# Patient Record
Sex: MALE | URBAN - METROPOLITAN AREA
[De-identification: names, ages, dates, MRNs, and addresses within clinical notes are randomized per-mention and may not be internally consistent; named-entity substitution may affect disease eponyms.]

---

## 2021-01-15 ENCOUNTER — NEW PATIENT (OUTPATIENT)
Dept: URBAN - METROPOLITAN AREA CLINIC 43 | Facility: CLINIC | Age: 71
End: 2021-01-15
Payer: COMMERCIAL

## 2021-01-15 DIAGNOSIS — H52.4 PRESBYOPIA: ICD-10-CM

## 2021-01-15 DIAGNOSIS — H25.812 COMBINED FORMS OF AGE-RELATED CATARACT, LEFT EYE: ICD-10-CM

## 2021-01-15 DIAGNOSIS — H40.023 OPEN ANGLE WITH BORDERLINE FINDINGS, HIGH RISK, BILATERAL: ICD-10-CM

## 2021-01-15 DIAGNOSIS — Z79.4 LONG TERM (CURRENT) USE OF INSULIN: ICD-10-CM

## 2021-01-15 PROCEDURE — 99204 OFFICE O/P NEW MOD 45 MIN: CPT | Performed by: OPTOMETRIST

## 2021-01-15 PROCEDURE — 92134 CPTRZ OPH DX IMG PST SGM RTA: CPT | Performed by: OPTOMETRIST

## 2021-01-15 ASSESSMENT — KERATOMETRY
OD: 41.88
OS: 42.38

## 2021-01-15 ASSESSMENT — VISUAL ACUITY
OS: 20/20
OD: 20/20

## 2021-01-15 ASSESSMENT — INTRAOCULAR PRESSURE
OD: 19
OS: 20

## 2021-02-03 ENCOUNTER — FOLLOW UP ESTABLISHED (OUTPATIENT)
Dept: URBAN - METROPOLITAN AREA CLINIC 43 | Facility: CLINIC | Age: 71
End: 2021-02-03
Payer: COMMERCIAL

## 2021-02-03 PROCEDURE — 92133 CPTRZD OPH DX IMG PST SGM ON: CPT | Performed by: OPTOMETRIST

## 2021-02-03 PROCEDURE — 92083 EXTENDED VISUAL FIELD XM: CPT | Performed by: OPTOMETRIST

## 2021-02-03 PROCEDURE — 76514 ECHO EXAM OF EYE THICKNESS: CPT | Performed by: OPTOMETRIST

## 2021-02-03 PROCEDURE — 92012 INTRM OPH EXAM EST PATIENT: CPT | Performed by: OPTOMETRIST

## 2021-02-03 RX ORDER — LATANOPROST 50 UG/ML
0.005 % SOLUTION OPHTHALMIC
Qty: 2.5 | Refills: 3 | Status: INACTIVE
Start: 2021-02-03 | End: 2021-03-03

## 2021-02-03 ASSESSMENT — INTRAOCULAR PRESSURE
OS: 15
OD: 17

## 2021-03-03 ENCOUNTER — FOLLOW UP ESTABLISHED (OUTPATIENT)
Dept: URBAN - METROPOLITAN AREA CLINIC 43 | Facility: CLINIC | Age: 71
End: 2021-03-03
Payer: COMMERCIAL

## 2021-03-03 PROCEDURE — 92012 INTRM OPH EXAM EST PATIENT: CPT | Performed by: OPTOMETRIST

## 2021-03-03 RX ORDER — LATANOPROST 50 UG/ML
0.005 % SOLUTION OPHTHALMIC
Qty: 7.5 | Refills: 1 | Status: INACTIVE
Start: 2021-03-03 | End: 2021-05-21

## 2021-03-03 ASSESSMENT — INTRAOCULAR PRESSURE
OD: 15
OS: 13

## 2021-07-14 ENCOUNTER — OFFICE VISIT (OUTPATIENT)
Dept: URBAN - METROPOLITAN AREA CLINIC 43 | Facility: CLINIC | Age: 71
End: 2021-07-14
Payer: COMMERCIAL

## 2021-07-14 DIAGNOSIS — H40.1132 PRIMARY OPEN-ANGLE GLAUCOMA, BILATERAL, MODERATE STAGE: Primary | ICD-10-CM

## 2021-07-14 PROCEDURE — 99213 OFFICE O/P EST LOW 20 MIN: CPT | Performed by: OPTOMETRIST

## 2021-07-14 RX ORDER — LATANOPROST 50 UG/ML
0.005 % SOLUTION OPHTHALMIC
Qty: 7.5 | Refills: 2 | Status: ACTIVE
Start: 2021-07-14

## 2021-07-14 ASSESSMENT — INTRAOCULAR PRESSURE
OS: 12
OD: 13

## 2021-07-14 NOTE — IMPRESSION/PLAN
Impression: Primary open-angle glaucoma, bilateral, moderate stage: P05.5603. pachs avg OU Plan: moderate/advanced cupping OS>OD
IOPs 13/12   on latanoprost qhs OU
HVF 02/03/21: fairly reliable OU, OD: superior and inf arcuate, OS: dense sup and inf arcuate OCT RNFL 02/03/21: OD: thin
return 6 months for CE/HVF/OCT RNFL

## 2022-01-14 ENCOUNTER — OFFICE VISIT (OUTPATIENT)
Dept: URBAN - METROPOLITAN AREA CLINIC 43 | Facility: CLINIC | Age: 72
End: 2022-01-14
Payer: COMMERCIAL

## 2022-01-14 DIAGNOSIS — E11.3393 TYPE 2 DIAB W MODERATE NONPRLF DIAB RTNOP W/O MACULAR EDEMA, BILATERAL: ICD-10-CM

## 2022-01-14 DIAGNOSIS — H43.811 VITREOUS DEGENERATION, RIGHT EYE: ICD-10-CM

## 2022-01-14 DIAGNOSIS — E11.3313 DIABETES MELLITUS TYPE 2 WITH MODERATE NON-PROLIFE: Primary | ICD-10-CM

## 2022-01-14 PROCEDURE — 92133 CPTRZD OPH DX IMG PST SGM ON: CPT | Performed by: OPTOMETRIST

## 2022-01-14 PROCEDURE — 99214 OFFICE O/P EST MOD 30 MIN: CPT | Performed by: OPTOMETRIST

## 2022-01-14 PROCEDURE — 92083 EXTENDED VISUAL FIELD XM: CPT | Performed by: OPTOMETRIST

## 2022-01-14 ASSESSMENT — VISUAL ACUITY
OS: 20/20
OD: 20/20

## 2022-01-14 ASSESSMENT — INTRAOCULAR PRESSURE
OD: 13
OS: 13

## 2022-01-14 NOTE — IMPRESSION/PLAN
Impression: Primary open-angle glaucoma, bilateral, moderate stage: V99.7802. pachs avg OU Plan: moderate/advanced cupping OS>OD
IOPs  13/13 on latanoprost qhs OU HVF today: somewhat reliable, OD: inf and sup arcuate, OS: dense sup arcuate, inf arcuate (possible progression OU) OCT RNFL today: OD: sup and inf thinning (72), OS: sup and inf thinning (55), stable OU possible progression on HVF, but stable OCT RNFL, Return 1 month for repeat HVF/IOP, if progession confirmed, start rocklatan qhs OU

## 2022-01-14 NOTE — IMPRESSION/PLAN
Impression: Vitreous degeneration, right eye: H43.811. Plan: Warning signs of retinal tear and detachment discussed with patient. To return to clinic STAT if any change in symptoms consistent with RT or RD.

## 2022-01-14 NOTE — IMPRESSION/PLAN
Impression: Type 2 diab w moderate nonprlf diab rtnop w/o macular edema, bilateral: Z77.3647. Plan: Diabetes type II: moderate background diabetic retinopathy, no signs of neovascularization noted. Will refer patient for a retinal consult to determine if treatment is necessary. Discussed ocular and systemic benefits of maintaining blood sugar control.   s/p PRP OU and multiple injections, no DME today, cont to see Dr. Vinay Morgan

## 2022-02-18 ENCOUNTER — OFFICE VISIT (OUTPATIENT)
Dept: URBAN - METROPOLITAN AREA CLINIC 43 | Facility: CLINIC | Age: 72
End: 2022-02-18
Payer: COMMERCIAL

## 2022-02-18 PROCEDURE — 92083 EXTENDED VISUAL FIELD XM: CPT | Performed by: OPTOMETRIST

## 2022-02-18 PROCEDURE — 99213 OFFICE O/P EST LOW 20 MIN: CPT | Performed by: OPTOMETRIST

## 2022-02-18 ASSESSMENT — INTRAOCULAR PRESSURE
OD: 13
OD: 15
OS: 14
OS: 13

## 2022-02-18 NOTE — IMPRESSION/PLAN
Impression: Primary open-angle glaucoma, bilateral, moderate stage: H40.8442. pachs avg OU Plan: moderate/advanced cupping OS>OD
IOPs  15/13 on latanoprost qhs OU HVF today: somewhat reliable, OD: inf and sup arcuate, OS: dense sup arcuate, inf arcuate (progresion confirmed) OCT RNFL 1/14/21: OD: sup and inf thinning (72), OS: sup and inf thinning (55), stable OU
switch to rocklatan qhs OU, return 6 weeks for IOP check

## 2022-03-31 ENCOUNTER — OFFICE VISIT (OUTPATIENT)
Dept: URBAN - METROPOLITAN AREA CLINIC 43 | Facility: CLINIC | Age: 72
End: 2022-03-31
Payer: COMMERCIAL

## 2022-03-31 DIAGNOSIS — H11.30 SUBCONJUNCTIVAL HEMORRHAGE: ICD-10-CM

## 2022-03-31 PROCEDURE — 99213 OFFICE O/P EST LOW 20 MIN: CPT | Performed by: OPTOMETRIST

## 2022-03-31 RX ORDER — BRIMONIDINE TARTRATE 2 MG/ML
0.2 % SOLUTION/ DROPS OPHTHALMIC
Qty: 5 | Refills: 2 | Status: ACTIVE
Start: 2022-03-31

## 2022-03-31 ASSESSMENT — INTRAOCULAR PRESSURE
OD: 15
OS: 13
OD: 13
OS: 12

## 2022-03-31 NOTE — IMPRESSION/PLAN
Impression: Primary open-angle glaucoma, bilateral, moderate stage: H63.4597. pachs avg OU Plan: moderate/advanced cupping OS>OD
IOPs 15/12 on Rocklatan QHS OU
HVF 2/18/22: somewhat reliable, OD: inf and sup arcuate, OS: dense sup arcuate, inf arcuate (progression confirmed) OCT RNFL 1/14/21: OD: sup and inf thinning (72), OS: sup and inf thinning (55), stable OU Continue Rocklatan QHS OU, start brimonidine BID OD
IOP check in 2 months

## 2022-06-02 ENCOUNTER — OFFICE VISIT (OUTPATIENT)
Dept: URBAN - METROPOLITAN AREA CLINIC 43 | Facility: CLINIC | Age: 72
End: 2022-06-02
Payer: COMMERCIAL

## 2022-06-02 DIAGNOSIS — H40.1132 PRIMARY OPEN-ANGLE GLAUCOMA, BILATERAL, MODERATE STAGE: Primary | ICD-10-CM

## 2022-06-02 PROCEDURE — 99213 OFFICE O/P EST LOW 20 MIN: CPT | Performed by: OPTOMETRIST

## 2022-06-02 RX ORDER — BRIMONIDINE TARTRATE 2 MG/ML
0.2 % SOLUTION/ DROPS OPHTHALMIC
Qty: 5 | Refills: 3 | Status: ACTIVE
Start: 2022-06-02

## 2022-06-02 RX ORDER — NETARSUDIL AND LATANOPROST OPHTHALMIC SOLUTION, 0.02%/0.005% .2; .05 MG/ML; MG/ML
SOLUTION/ DROPS OPHTHALMIC; TOPICAL
Qty: 7.5 | Refills: 1 | Status: ACTIVE
Start: 2022-06-02

## 2022-06-02 ASSESSMENT — INTRAOCULAR PRESSURE
OS: 14
OD: 12
OS: 12
OD: 15

## 2022-06-02 NOTE — IMPRESSION/PLAN
Impression: Primary open-angle glaucoma, bilateral, moderate stage: W87.2413. pachs avg OU Plan: moderate/advanced cupping OS>OD
IOPs 12/12 on Rocklatan QHS OU, brimonidine BID OD
HVF 2/18/22: somewhat reliable, OD: inf and sup arcuate, OS: dense sup arcuate, inf arcuate (progression confirmed) OCT RNFL 1/14/21: OD: sup and inf thinning (72), OS: sup and inf thinning (55), stable OU Continue Rocklatan QHS OU, brimonidine BID OD
return 4 months for IOP

## 2022-06-10 ENCOUNTER — OFFICE VISIT (OUTPATIENT)
Dept: URBAN - METROPOLITAN AREA CLINIC 43 | Facility: CLINIC | Age: 72
End: 2022-06-10
Payer: COMMERCIAL

## 2022-06-10 DIAGNOSIS — L25.9 UNSPECIFIED CONTACT DERMATITIS, UNSPECIFIED CAUSE: Primary | ICD-10-CM

## 2022-06-10 PROCEDURE — 99213 OFFICE O/P EST LOW 20 MIN: CPT | Performed by: OPTOMETRIST

## 2022-06-10 RX ORDER — PREDNISOLONE ACETATE 10 MG/ML
1 % SUSPENSION/ DROPS OPHTHALMIC
Qty: 5 | Refills: 0 | Status: ACTIVE
Start: 2022-06-10

## 2022-06-10 RX ORDER — METHYLPREDNISOLONE 4 MG/1
4 MG TABLET ORAL
Qty: 1 | Refills: 0 | Status: ACTIVE
Start: 2022-06-10

## 2022-06-10 ASSESSMENT — INTRAOCULAR PRESSURE
OD: 13
OS: 13

## 2022-06-10 NOTE — IMPRESSION/PLAN
Impression: Unspecified contact dermatitis, unspecified cause: L25.9. Plan: could be allergy to brimonidine, with allergic conjunctivitis.   Start pred TID OU, medrol thierry, f/u in 1 week, IOP may be elevated, dwp to d/c brimonidine

## 2022-06-17 ENCOUNTER — OFFICE VISIT (OUTPATIENT)
Dept: URBAN - METROPOLITAN AREA CLINIC 43 | Facility: CLINIC | Age: 72
End: 2022-06-17
Payer: COMMERCIAL

## 2022-06-17 DIAGNOSIS — H40.1132 PRIMARY OPEN-ANGLE GLAUCOMA, BILATERAL, MODERATE STAGE: ICD-10-CM

## 2022-06-17 DIAGNOSIS — L25.9 UNSPECIFIED CONTACT DERMATITIS, UNSPECIFIED CAUSE: Primary | ICD-10-CM

## 2022-06-17 PROCEDURE — 99213 OFFICE O/P EST LOW 20 MIN: CPT | Performed by: OPTOMETRIST

## 2022-06-17 ASSESSMENT — INTRAOCULAR PRESSURE
OS: 12
OD: 12

## 2022-06-17 NOTE — IMPRESSION/PLAN
Impression: Unspecified contact dermatitis, unspecified cause: L25.9.  Plan: 2/2 brimonidine, improved, taper pred QD x 3 more days then d/c, use ATs

## 2022-06-17 NOTE — IMPRESSION/PLAN
Impression: Primary open-angle glaucoma, bilateral, moderate stage: Q41.8660. pachs avg OU
allergy to brimonidine Plan: moderate/advanced cupping OS>OD
IOPs 12/12 on Rocklatan QHS OU only (d/c brimonidine) HVF 2/18/22: somewhat reliable, OD: inf and sup arcuate, OS: dense sup arcuate, inf arcuate (progression confirmed) OCT RNFL 1/14/21: OD: sup and inf thinning (72), OS: sup and inf thinning (55), stable OU Continue Rocklatan QHS OU
keep next appt

## 2022-11-22 ENCOUNTER — OFFICE VISIT (OUTPATIENT)
Dept: URBAN - METROPOLITAN AREA CLINIC 43 | Facility: CLINIC | Age: 72
End: 2022-11-22
Payer: COMMERCIAL

## 2022-11-22 DIAGNOSIS — H40.1132 PRIMARY OPEN-ANGLE GLAUCOMA, MODERATE STAGE, BILATERAL: Primary | ICD-10-CM

## 2022-11-22 PROCEDURE — 99213 OFFICE O/P EST LOW 20 MIN: CPT | Performed by: OPTOMETRIST

## 2022-11-22 RX ORDER — NETARSUDIL AND LATANOPROST OPHTHALMIC SOLUTION, 0.02%/0.005% .2; .05 MG/ML; MG/ML
SOLUTION/ DROPS OPHTHALMIC; TOPICAL
Qty: 7.5 | Refills: 1 | Status: ACTIVE
Start: 2022-11-22

## 2022-11-22 ASSESSMENT — INTRAOCULAR PRESSURE
OS: 10
OD: 11

## 2022-11-22 NOTE — IMPRESSION/PLAN
Impression: Primary open-angle glaucoma, bilateral, moderate stage: E32.4782. pachs avg OU
allergy to brimonidine Plan: moderate/advanced cupping OS>OD
IOP: 11/10  on Rocklatan QHS OU 
HVF 2/18/22: somewhat reliable, OD: inf and sup arcuate, OS: dense sup arcuate, inf arcuate (progression confirmed) OCT RNFL 1/14/22 OD: sup and inf thinning (72), OS: sup and inf thinning (55), stable OU Continue Rocklatan QHS OU
RTC 3 months for CE/VF/RNFL (pt is moving to [facility], will schedule at [facility])

## 2023-02-20 ENCOUNTER — OFFICE VISIT (OUTPATIENT)
Dept: URBAN - METROPOLITAN AREA CLINIC 63 | Facility: CLINIC | Age: 73
End: 2023-02-20
Payer: COMMERCIAL

## 2023-02-20 DIAGNOSIS — H40.1132 PRIMARY OPEN-ANGLE GLAUCOMA, BILATERAL, MODERATE STAGE: Primary | ICD-10-CM

## 2023-02-20 DIAGNOSIS — E11.3553 TYPE 2 DIABETES WITH STABLE PROLIF DIABETIC RTNOP, BILATERAL: ICD-10-CM

## 2023-02-20 DIAGNOSIS — H26.491 OTHER SECONDARY CATARACT, RIGHT EYE: ICD-10-CM

## 2023-02-20 DIAGNOSIS — H25.812 COMBINED FORMS OF AGE-RELATED CATARACT, LEFT EYE: ICD-10-CM

## 2023-02-20 DIAGNOSIS — H01.8 OTHER SPECIFIED INFLAMMATIONS OF EYELID: ICD-10-CM

## 2023-02-20 PROCEDURE — 92083 EXTENDED VISUAL FIELD XM: CPT | Performed by: OPHTHALMOLOGY

## 2023-02-20 PROCEDURE — 92133 CPTRZD OPH DX IMG PST SGM ON: CPT | Performed by: OPHTHALMOLOGY

## 2023-02-20 PROCEDURE — 99204 OFFICE O/P NEW MOD 45 MIN: CPT | Performed by: OPHTHALMOLOGY

## 2023-02-20 RX ORDER — BACITRACIN ZINC AND POLYMYXIN B SULFATE 500; 10000 [USP'U]/G; [USP'U]/G
OINTMENT OPHTHALMIC
Qty: 3.5 | Refills: 3 | Status: ACTIVE
Start: 2023-02-20

## 2023-02-20 ASSESSMENT — VISUAL ACUITY
OD: 20/30
OS: 20/40

## 2023-02-20 ASSESSMENT — INTRAOCULAR PRESSURE
OS: 11
OD: 14

## 2023-02-20 NOTE — IMPRESSION/PLAN
Impression: Type 2 diabetes with stable prolif diabetic rtnop, bilateral: e11.3553. Plan: Stable. Control glucose. Patient seen by a Retina Specialist in [place]. Refer to Retina For evaluation and treatment.

## 2023-02-20 NOTE — IMPRESSION/PLAN
Impression: Combined forms of age-related cataract, left eye: H25.812. Plan: Cataracts account for patient's symptoms. Consider cataract sx with MIGs after returns from trip.

## 2023-02-20 NOTE — IMPRESSION/PLAN
Impression: Primary open-angle glaucoma, bilateral, moderate stage: Q85.3329. Plan: Irritation with Rocklatan. D/C. Start Lumigan qHS OU. Consider SLT or Durysta. Possible MIGs with cataract sx OS.

## 2023-03-06 ENCOUNTER — OFFICE VISIT (OUTPATIENT)
Dept: URBAN - METROPOLITAN AREA CLINIC 63 | Facility: CLINIC | Age: 73
End: 2023-03-06
Payer: COMMERCIAL

## 2023-03-06 DIAGNOSIS — H40.1132 PRIMARY OPEN-ANGLE GLAUCOMA, BILATERAL, MODERATE STAGE: Primary | ICD-10-CM

## 2023-03-06 DIAGNOSIS — H10.45 OTHER CHRONIC ALLERGIC CONJUNCTIVITIS: ICD-10-CM

## 2023-03-06 DIAGNOSIS — H25.812 COMBINED FORMS OF AGE-RELATED CATARACT, LEFT EYE: ICD-10-CM

## 2023-03-06 PROCEDURE — 99213 OFFICE O/P EST LOW 20 MIN: CPT | Performed by: OPHTHALMOLOGY

## 2023-03-06 PROCEDURE — 92020 GONIOSCOPY: CPT | Performed by: OPHTHALMOLOGY

## 2023-03-06 RX ORDER — BIMATOPROST 0.1 MG/ML
0.01 % SOLUTION/ DROPS OPHTHALMIC
Qty: 7.5 | Refills: 3 | Status: ACTIVE
Start: 2023-03-06

## 2023-03-06 RX ORDER — KETOTIFEN FUMARATE 0.25 MG/ML
SOLUTION/ DROPS OPHTHALMIC
Qty: 5 | Refills: 8 | Status: ACTIVE
Start: 2023-03-06

## 2023-03-06 ASSESSMENT — INTRAOCULAR PRESSURE
OS: 12
OD: 12
OD: 14

## 2023-03-06 NOTE — IMPRESSION/PLAN
Impression: Primary open-angle glaucoma, bilateral, moderate stage: G88.4497. Plan: IOP stable on Lumigan. Cont. Lumigan qHS OU. Consider SLT or Durysta. Possible MIGs with cataract sx OS.

## 2023-05-26 ENCOUNTER — OFFICE VISIT (OUTPATIENT)
Dept: URBAN - METROPOLITAN AREA CLINIC 63 | Facility: CLINIC | Age: 73
End: 2023-05-26
Payer: COMMERCIAL

## 2023-05-26 DIAGNOSIS — H25.812 COMBINED FORMS OF AGE-RELATED CATARACT, LEFT EYE: ICD-10-CM

## 2023-05-26 DIAGNOSIS — H40.1132 PRIMARY OPEN-ANGLE GLAUCOMA, BILATERAL, MODERATE STAGE: ICD-10-CM

## 2023-05-26 DIAGNOSIS — E11.3553 TYPE 2 DIABETES WITH STABLE PROLIF DIABETIC RTNOP, BILATERAL: Primary | ICD-10-CM

## 2023-05-26 PROCEDURE — 99214 OFFICE O/P EST MOD 30 MIN: CPT | Performed by: OPHTHALMOLOGY

## 2023-05-26 RX ORDER — BIMATOPROST 0.1 MG/ML
0.01 % SOLUTION/ DROPS OPHTHALMIC
Qty: 7.5 | Refills: 3 | Status: ACTIVE
Start: 2023-05-26

## 2023-05-26 ASSESSMENT — INTRAOCULAR PRESSURE
OS: 14
OD: 14

## 2023-05-26 ASSESSMENT — VISUAL ACUITY
OS: 20/40
OD: 20/30

## 2023-05-26 ASSESSMENT — KERATOMETRY
OD: 42.13
OS: 41.88

## 2023-05-26 NOTE — IMPRESSION/PLAN
Impression: Combined forms of age-related cataract, left eye: H25.812. Plan: Pt with mono-vision result postop. Discussed sx vs observation. Pt elects to observe for worsening.
Impression: Primary open-angle glaucoma, bilateral, moderate stage: F93.6539. Plan: Not taking Lumigan. Restart Lumigan ou qHS. Consider drops or Durysta.
Impression: Type 2 diabetes with stable prolif diabetic rtnop, bilateral: R54.0093. Plan: Refer to Retina Specialist 2-3 weeks.  
s/p laser
normal for race

## 2023-08-04 ENCOUNTER — OFFICE VISIT (OUTPATIENT)
Dept: URBAN - METROPOLITAN AREA CLINIC 63 | Facility: CLINIC | Age: 73
End: 2023-08-04
Payer: COMMERCIAL

## 2023-08-04 DIAGNOSIS — H40.1132 PRIMARY OPEN-ANGLE GLAUCOMA, BILATERAL, MODERATE STAGE: Primary | ICD-10-CM

## 2023-08-04 DIAGNOSIS — H10.45 OTHER CHRONIC ALLERGIC CONJUNCTIVITIS: ICD-10-CM

## 2023-08-04 PROCEDURE — 99213 OFFICE O/P EST LOW 20 MIN: CPT | Performed by: OPHTHALMOLOGY

## 2023-08-04 RX ORDER — BIMATOPROST 0.1 MG/ML
0.01 % SOLUTION/ DROPS OPHTHALMIC
Qty: 7.5 | Refills: 3 | Status: ACTIVE
Start: 2023-08-04

## 2023-08-04 ASSESSMENT — INTRAOCULAR PRESSURE
OS: 13
OD: 15
OD: 16
OS: 15

## 2024-02-19 ENCOUNTER — OFFICE VISIT (OUTPATIENT)
Dept: URBAN - METROPOLITAN AREA CLINIC 63 | Facility: CLINIC | Age: 74
End: 2024-02-19
Payer: COMMERCIAL

## 2024-02-19 DIAGNOSIS — H25.812 COMBINED FORMS OF AGE-RELATED CATARACT, LEFT EYE: ICD-10-CM

## 2024-02-19 DIAGNOSIS — H40.1132 PRIMARY OPEN-ANGLE GLAUCOMA, BILATERAL, MODERATE STAGE: Primary | ICD-10-CM

## 2024-02-19 PROCEDURE — 92133 CPTRZD OPH DX IMG PST SGM ON: CPT | Performed by: OPHTHALMOLOGY

## 2024-02-19 PROCEDURE — 99213 OFFICE O/P EST LOW 20 MIN: CPT | Performed by: OPHTHALMOLOGY

## 2024-02-19 PROCEDURE — 92083 EXTENDED VISUAL FIELD XM: CPT | Performed by: OPHTHALMOLOGY

## 2024-02-19 RX ORDER — BIMATOPROST 0.1 MG/ML
0.01 % SOLUTION/ DROPS OPHTHALMIC
Qty: 7.5 | Refills: 3 | Status: ACTIVE
Start: 2024-02-19

## 2024-02-19 ASSESSMENT — INTRAOCULAR PRESSURE
OS: 10
OS: 13
OD: 13
OD: 12

## 2024-05-24 ENCOUNTER — OFFICE VISIT (OUTPATIENT)
Dept: URBAN - METROPOLITAN AREA CLINIC 63 | Facility: CLINIC | Age: 74
End: 2024-05-24
Payer: COMMERCIAL

## 2024-05-24 DIAGNOSIS — H25.812 COMBINED FORMS OF AGE-RELATED CATARACT, LEFT EYE: ICD-10-CM

## 2024-05-24 DIAGNOSIS — E11.3553 TYPE 2 DIABETES WITH STABLE PROLIF DIABETIC RTNOP, BILATERAL: Primary | ICD-10-CM

## 2024-05-24 DIAGNOSIS — H40.1132 PRIMARY OPEN-ANGLE GLAUCOMA, BILATERAL, MODERATE STAGE: ICD-10-CM

## 2024-05-24 PROCEDURE — 92014 COMPRE OPH EXAM EST PT 1/>: CPT | Performed by: OPHTHALMOLOGY

## 2024-05-24 PROCEDURE — 92134 CPTRZ OPH DX IMG PST SGM RTA: CPT | Performed by: OPHTHALMOLOGY

## 2024-05-24 ASSESSMENT — INTRAOCULAR PRESSURE
OS: 14
OD: 11
OD: 13
OS: 11

## 2024-05-24 ASSESSMENT — KERATOMETRY
OD: 42.38
OS: 42.13

## 2024-12-06 ENCOUNTER — OFFICE VISIT (OUTPATIENT)
Dept: URBAN - METROPOLITAN AREA CLINIC 63 | Facility: CLINIC | Age: 74
End: 2024-12-06
Payer: MEDICARE

## 2024-12-06 DIAGNOSIS — H40.1132 PRIMARY OPEN-ANGLE GLAUCOMA, BILATERAL, MODERATE STAGE: Primary | ICD-10-CM

## 2024-12-06 DIAGNOSIS — H26.491 OTHER SECONDARY CATARACT, RIGHT EYE: ICD-10-CM

## 2024-12-06 DIAGNOSIS — H16.223 KERATOCONJUNCT SICCA, NOT SPECIFIED AS SJOGREN'S, BILATERAL: ICD-10-CM

## 2024-12-06 DIAGNOSIS — H25.812 COMBINED FORMS OF AGE-RELATED CATARACT, LEFT EYE: ICD-10-CM

## 2024-12-06 PROCEDURE — 99213 OFFICE O/P EST LOW 20 MIN: CPT | Performed by: OPHTHALMOLOGY

## 2024-12-06 ASSESSMENT — INTRAOCULAR PRESSURE
OD: 13
OS: 12
OS: 10
OD: 12

## 2025-05-23 ENCOUNTER — OFFICE VISIT (OUTPATIENT)
Dept: URBAN - METROPOLITAN AREA CLINIC 63 | Facility: CLINIC | Age: 75
End: 2025-05-23
Payer: MEDICARE

## 2025-05-23 DIAGNOSIS — E11.3512 TYPE 2 DIABETES MELLITUS W/ PROLIFERATIVE DIABETIC RETINOPATHY W/ MACULAR EDEMA, LEFT EYE: ICD-10-CM

## 2025-05-23 DIAGNOSIS — H25.812 COMBINED FORMS OF AGE-RELATED CATARACT, LEFT EYE: ICD-10-CM

## 2025-05-23 DIAGNOSIS — E11.3551 TYPE 2 DIABETES WITH STABLE PROLIF DIABETIC RTNOP, RIGHT EYE: ICD-10-CM

## 2025-05-23 DIAGNOSIS — H26.491 OTHER SECONDARY CATARACT, RIGHT EYE: ICD-10-CM

## 2025-05-23 DIAGNOSIS — H40.1132 PRIMARY OPEN-ANGLE GLAUCOMA, BILATERAL, MODERATE STAGE: Primary | ICD-10-CM

## 2025-05-23 PROCEDURE — 92133 CPTRZD OPH DX IMG PST SGM ON: CPT | Performed by: OPHTHALMOLOGY

## 2025-05-23 PROCEDURE — 92134 CPTRZ OPH DX IMG PST SGM RTA: CPT | Performed by: OPHTHALMOLOGY

## 2025-05-23 PROCEDURE — 92014 COMPRE OPH EXAM EST PT 1/>: CPT | Performed by: OPHTHALMOLOGY

## 2025-05-23 ASSESSMENT — VISUAL ACUITY
OS: 20/40
OD: 20/40

## 2025-05-23 ASSESSMENT — INTRAOCULAR PRESSURE
OS: 10
OD: 11
OS: 11

## 2025-05-23 ASSESSMENT — KERATOMETRY
OS: 42.00
OD: 42.50